# Patient Record
Sex: MALE
[De-identification: names, ages, dates, MRNs, and addresses within clinical notes are randomized per-mention and may not be internally consistent; named-entity substitution may affect disease eponyms.]

---

## 2024-09-11 ENCOUNTER — TRANSCRIBE ORDERS (OUTPATIENT)
Dept: OTHER | Age: 24
End: 2024-09-11

## 2024-09-11 DIAGNOSIS — F64.9 GENDER INCONGRUENCE: Primary | ICD-10-CM

## 2024-09-12 ENCOUNTER — TELEPHONE (OUTPATIENT)
Dept: PLASTIC SURGERY | Facility: CLINIC | Age: 24
End: 2024-09-12
Payer: MEDICAID

## 2024-09-12 NOTE — CONFIDENTIAL NOTE
Writer LVM re: referral from Dr. Chang at [facility]. Pt referred for FT top surgery.

## 2024-10-09 ENCOUNTER — TELEPHONE (OUTPATIENT)
Dept: PLASTIC SURGERY | Facility: CLINIC | Age: 24
End: 2024-10-09
Payer: MEDICAID

## 2024-10-09 NOTE — CONFIDENTIAL NOTE
Pt LVM requesting email to discuss scheduling a top surgery consult. Writer sent email to [email or web address] relaying that we can add them to Dr. Del Valle's consult waitlist for consults at end of 2025. Also sent referral list.

## 2024-10-14 ENCOUNTER — TELEPHONE (OUTPATIENT)
Dept: PLASTIC SURGERY | Facility: CLINIC | Age: 24
End: 2024-10-14
Payer: MEDICAID

## 2024-10-14 NOTE — CONFIDENTIAL NOTE
Pt emailed writer and requested to get on Dr. Del Valle's 2026 consult waitlist. Writer added pt and emailed them back to let them know.

## 2024-11-04 ENCOUNTER — TELEPHONE (OUTPATIENT)
Dept: PLASTIC SURGERY | Facility: CLINIC | Age: 24
End: 2024-11-04
Payer: MEDICAID

## 2024-11-04 NOTE — CONFIDENTIAL NOTE
Writer emailed pt back offering next available consult with Dr. Del Valle to discuss top surgery as pt is on her consult waitlist.

## 2024-11-05 ENCOUNTER — TELEPHONE (OUTPATIENT)
Dept: PLASTIC SURGERY | Facility: CLINIC | Age: 24
End: 2024-11-05
Payer: MEDICAID

## 2024-11-05 NOTE — CONFIDENTIAL NOTE
[facility] :  Care Coordination Note     SITUATION   Nathalia Wisdom (they/ve) is a 24 year old male who is receiving support for:  Care Team  .    BACKGROUND     Pt is scheduled for a top surgery consult with Dr. Del Valle on 10/27/25.     Pt scheduled this via email.     ASSESSMENT     Surgery              [facility] Assessment  [facility] ([facility] Enrollment: (P) Enrolled  Patient has a therapist: (P) Yes  Name of therapist: (P) Nayana Tatum  Letter of support #1: (P) Requested  Surgery being considered: (P) Yes  Mastectomy: (P) Yes    Pt reports:   No nicotine or other gender affirming surgeries  PLAN          Nursing Interventions:       Follow-up plan:  1. Obtain ILANA Nowak

## 2025-10-27 ENCOUNTER — PRE VISIT (OUTPATIENT)
Dept: PLASTIC SURGERY | Facility: CLINIC | Age: 25
End: 2025-10-27